# Patient Record
Sex: FEMALE | Race: WHITE | Employment: FULL TIME | URBAN - METROPOLITAN AREA
[De-identification: names, ages, dates, MRNs, and addresses within clinical notes are randomized per-mention and may not be internally consistent; named-entity substitution may affect disease eponyms.]

---

## 2019-02-11 ENCOUNTER — APPOINTMENT (OUTPATIENT)
Dept: GENERAL RADIOLOGY | Facility: CLINIC | Age: 44
End: 2019-02-11
Attending: EMERGENCY MEDICINE
Payer: COMMERCIAL

## 2019-02-11 ENCOUNTER — HOSPITAL ENCOUNTER (EMERGENCY)
Facility: CLINIC | Age: 44
Discharge: HOME | End: 2019-02-11
Attending: EMERGENCY MEDICINE | Admitting: EMERGENCY MEDICINE
Payer: COMMERCIAL

## 2019-02-11 VITALS
TEMPERATURE: 98.1 F | RESPIRATION RATE: 16 BRPM | HEART RATE: 96 BPM | OXYGEN SATURATION: 94 % | SYSTOLIC BLOOD PRESSURE: 128 MMHG | DIASTOLIC BLOOD PRESSURE: 87 MMHG

## 2019-02-11 DIAGNOSIS — R07.89 ATYPICAL CHEST PAIN: ICD-10-CM

## 2019-02-11 LAB
ANION GAP SERPL CALCULATED.3IONS-SCNC: 10 MMOL/L (ref 3–14)
B-HCG FREE SERPL-ACNC: <5 IU/L
BASOPHILS # BLD AUTO: 0.1 10E9/L (ref 0–0.2)
BASOPHILS NFR BLD AUTO: 0.8 %
BUN SERPL-MCNC: 10 MG/DL (ref 7–30)
CALCIUM SERPL-MCNC: 8.5 MG/DL (ref 8.5–10.1)
CHLORIDE SERPL-SCNC: 106 MMOL/L (ref 94–109)
CO2 SERPL-SCNC: 22 MMOL/L (ref 20–32)
CREAT SERPL-MCNC: 0.98 MG/DL (ref 0.52–1.04)
D DIMER PPP FEU-MCNC: <0.3 UG/ML FEU (ref 0–0.5)
DIFFERENTIAL METHOD BLD: ABNORMAL
EOSINOPHIL # BLD AUTO: 0.1 10E9/L (ref 0–0.7)
EOSINOPHIL NFR BLD AUTO: 1.1 %
ERYTHROCYTE [DISTWIDTH] IN BLOOD BY AUTOMATED COUNT: 13.5 % (ref 10–15)
GFR SERPL CREATININE-BSD FRML MDRD: 71 ML/MIN/{1.73_M2}
GLUCOSE SERPL-MCNC: 84 MG/DL (ref 70–99)
HCT VFR BLD AUTO: 40.5 % (ref 35–47)
HGB BLD-MCNC: 12.6 G/DL (ref 11.7–15.7)
IMM GRANULOCYTES # BLD: 0 10E9/L (ref 0–0.4)
IMM GRANULOCYTES NFR BLD: 0.2 %
INTERPRETATION ECG - MUSE: NORMAL
LYMPHOCYTES # BLD AUTO: 1.6 10E9/L (ref 0.8–5.3)
LYMPHOCYTES NFR BLD AUTO: 26.3 %
MCH RBC QN AUTO: 27.2 PG (ref 26.5–33)
MCHC RBC AUTO-ENTMCNC: 31.1 G/DL (ref 31.5–36.5)
MCV RBC AUTO: 87 FL (ref 78–100)
MONOCYTES # BLD AUTO: 0.5 10E9/L (ref 0–1.3)
MONOCYTES NFR BLD AUTO: 7.9 %
NEUTROPHILS # BLD AUTO: 3.9 10E9/L (ref 1.6–8.3)
NEUTROPHILS NFR BLD AUTO: 63.7 %
NRBC # BLD AUTO: 0 10*3/UL
NRBC BLD AUTO-RTO: 0 /100
PLATELET # BLD AUTO: 305 10E9/L (ref 150–450)
POTASSIUM SERPL-SCNC: 3.8 MMOL/L (ref 3.4–5.3)
RBC # BLD AUTO: 4.64 10E12/L (ref 3.8–5.2)
SODIUM SERPL-SCNC: 138 MMOL/L (ref 133–144)
TROPONIN I SERPL-MCNC: <0.015 UG/L (ref 0–0.04)
WBC # BLD AUTO: 6.2 10E9/L (ref 4–11)

## 2019-02-11 PROCEDURE — 85025 COMPLETE CBC W/AUTO DIFF WBC: CPT | Performed by: EMERGENCY MEDICINE

## 2019-02-11 PROCEDURE — 96374 THER/PROPH/DIAG INJ IV PUSH: CPT

## 2019-02-11 PROCEDURE — 85379 FIBRIN DEGRADATION QUANT: CPT | Performed by: EMERGENCY MEDICINE

## 2019-02-11 PROCEDURE — 90791 PSYCH DIAGNOSTIC EVALUATION: CPT

## 2019-02-11 PROCEDURE — 84702 CHORIONIC GONADOTROPIN TEST: CPT

## 2019-02-11 PROCEDURE — 25000128 H RX IP 250 OP 636: Performed by: EMERGENCY MEDICINE

## 2019-02-11 PROCEDURE — 93005 ELECTROCARDIOGRAM TRACING: CPT

## 2019-02-11 PROCEDURE — 71046 X-RAY EXAM CHEST 2 VIEWS: CPT

## 2019-02-11 PROCEDURE — 84484 ASSAY OF TROPONIN QUANT: CPT | Performed by: EMERGENCY MEDICINE

## 2019-02-11 PROCEDURE — 99285 EMERGENCY DEPT VISIT HI MDM: CPT | Mod: 25

## 2019-02-11 PROCEDURE — 80048 BASIC METABOLIC PNL TOTAL CA: CPT | Performed by: EMERGENCY MEDICINE

## 2019-02-11 RX ORDER — LORAZEPAM 2 MG/ML
0.5 INJECTION INTRAMUSCULAR ONCE
Status: COMPLETED | OUTPATIENT
Start: 2019-02-11 | End: 2019-02-11

## 2019-02-11 RX ORDER — LORAZEPAM 0.5 MG/1
0.5 TABLET ORAL EVERY 6 HOURS PRN
Qty: 8 TABLET | Refills: 0 | Status: SHIPPED | OUTPATIENT
Start: 2019-02-11 | End: 2019-03-13

## 2019-02-11 RX ADMIN — LORAZEPAM 0.5 MG: 2 INJECTION INTRAMUSCULAR; INTRAVENOUS at 17:35

## 2019-02-11 SDOH — HEALTH STABILITY: MENTAL HEALTH: HOW OFTEN DO YOU HAVE A DRINK CONTAINING ALCOHOL?: NEVER

## 2019-02-11 ASSESSMENT — ENCOUNTER SYMPTOMS
NERVOUS/ANXIOUS: 1
NECK PAIN: 1
COUGH: 0

## 2019-02-11 NOTE — ED PROVIDER NOTES
"  History     Chief Complaint:  Chest Pain and Anxiety    HPI   Lyric Xavier is a 43 year old female who presents to the emergency department today for evaluation of chest pain and anxiety. For the past week the patient has been having intermittent episodes of chest pain characterized as \"pinching over her heart\", bad heart burn, and aches on the base of her left neck. These episodes last 15 minutes. She does state she has some left-sided leg swelling that is abnormal for her. No recent illness or cough. She does note that she has been under a lot of stress lately. She called her PMD for an appointment today, but has not ever sought psychiatry.    Allergies:  Iodine    Medications:    Medications reviewed. No pertinent medications.    Past Medical History:    Asthma    Past Surgical History:    History reviewed. No pertinent surgical history.    Family History:    Enlarged heart  Grandmother  Clot   Grandmother    Social History:  The patient was accompanied to the ED by nobody.  Smoking Status: Never Smoker  Smokeless Tobacco: Never Used  Alcohol Use: Positive      Review of Systems   Respiratory: Negative for cough.    Cardiovascular: Positive for chest pain and leg swelling.   Musculoskeletal: Positive for neck pain.   Psychiatric/Behavioral: The patient is nervous/anxious.    All other systems reviewed and are negative.    Physical Exam     Patient Vitals for the past 24 hrs:   BP Temp Temp src Pulse Heart Rate Resp SpO2   02/11/19 2000 128/87 -- -- 96 98 -- --   02/11/19 1955 -- -- -- -- -- 16 94 %   02/11/19 1940 129/84 -- -- 102 101 -- --   02/11/19 1900 -- -- -- -- 87 23 96 %   02/11/19 1840 (!) 132/93 -- -- 98 93 -- --   02/11/19 1835 -- -- -- -- 83 17 97 %   02/11/19 1800 123/82 -- -- 86 83 -- --   02/11/19 1755 -- -- -- -- 88 -- --   02/11/19 1743 116/76 -- -- 84 84 17 95 %   02/11/19 1700 120/78 -- -- 85 90 23 --   02/11/19 1620 126/83 -- -- 78 86 -- --   02/11/19 1600 126/85 -- -- -- -- -- -- "   02/11/19 1316 133/87 98.1  F (36.7  C) Temporal -- 88 16 98 %      Physical Exam  Nursing note and vitals reviewed.  Constitutional: Cooperative.   HENT:   Mouth/Throat: Moist mucous membranes.   Eyes: EOMI, nonicteric sclera  Cardiovascular: Normal rate, regular rhythm, no murmurs, rubs, or gallops  Pulmonary/Chest: Effort normal and breath sounds normal. No respiratory distress. No wheezes. No rales.   Abdominal: Soft. Nontender, nondistended, no guarding or rigidity. BS present.   Musculoskeletal: Normal range of motion.   Neurological: Alert. Moves all extremities spontaneously.   Skin: Skin is warm and dry. No rash noted.   Psychiatric: anxious mood and affect.       Emergency Department Course     ECG:  ECG taken at 1307, ECG read at 1643  Normal sinus rhythm  Normal ECG  Rate 80 bpm. IN interval 124 ms. QRS duration 68 ms. QT/QTc 394/454 ms. P-R-T axes 52 56 18.    Imaging:  Radiology findings were communicated with the patient who voiced understanding of the findings.    Chest XR,  PA & LAT  Cardiac silhouette and pulmonary vasculature are within  normal limits. No focal airspace disease, pleural effusion or  pneumothorax.  Reading per radiology    Laboratory:  Laboratory findings were communicated with the patient who voiced understanding of the findings.    HCG: <5.0  D dimer: <0.3  Troponin I: <0.015  CBC: WBC 6.2, HGB 12.6,   BMP: WNL (Creatinine 0.98)    Interventions:  1735 Ativan 0.5 mg IV    Emergency Department Course:    1640 Nursing notes and vitals reviewed.    1645 I performed an exam of the patient as documented above.     1717 IV was inserted and blood was drawn for laboratory testing, results above.     1814 The patient was sent for a XR while in the emergency department, results above.      2130 I personally reviewed the imaging and lab results with the patient and answered all related questions prior to discharge.    Impression & Plan      Medical Decision Making:  Lyric MURRAY  Morenita is a 43 year old female who presents to the emergency department today for evaluation of chest pain. The DDx of the patients chest pain includes ACS, angina, pericarditis, PE, pneumonia, pleuritis, dissection, aneurysmal disease, GERD, esophageal spasm, costochronditis/chest wall pain, etc as the most likely etiologies. Also considered stress/anxiety as pt reports she's been under a significant amount of stress recently. Chest pain workup is negative, and given symptoms have been ongoing for last week, single negative troponin sufficient to rule out ACS. D-dimer rules out PE and DVT. No further testing indicated. Given pt's stress levels, pt wanted to meet with DEC who was able to schedule an appointment for consideration of medications tomorrow morning. No SI/HI or indications for psychiatric admission. Pt is low risk by HEART score. Will have her follow-up with her primary care physician for consideration of further testing. She's discharged in stable condition. All questions answered.     Diagnosis:    ICD-10-CM    1. Atypical chest pain R07.89      Disposition:   Discharge    Discharge Medications:  START taking      Dose / Directions   LORazepam 0.5 MG tablet  Commonly known as:  ATIVAN      Dose:  0.5 mg  Take 1 tablet (0.5 mg) by mouth every 6 hours as needed for anxiety  Quantity:  8 tablet  Refills:  0       Scribe Disclosure:  Rickey ESPAÑA, am serving as a scribe at 4:41 PM on 2/11/2019 to document services personally performed by Aroldo Rudd MD based on my observations and the provider's statements to me.    [facility] EMERGENCY DEPARTMENT       Aroldo Rudd MD  02/14/19 0015

## 2019-02-11 NOTE — LETTER
February 11, 2019      To Whom It May Concern:      Lyric Xavier was seen in our Emergency Department today, 02/11/19.  I expect her condition to improve over the next 2 days.  She may return to work/school when improved.    Sincerely,        Gretchen CASE RN

## 2019-02-11 NOTE — ED AVS SNAPSHOT
[facility] Emergency Department  [address]  [facility] [address]  Phone:  [phone]  Fax:  [phone]                                    Lyric Xavier   MRN: [id number]    Department:  [facility] Emergency Department   Date of Visit:  2/11/2019           After Visit Summary Signature Page    I have received my discharge instructions, and my questions have been answered. I have discussed any challenges I see with this plan with the nurse or doctor.    ..........................................................................................................................................  Patient/Patient Representative Signature      ..........................................................................................................................................  Patient Representative Print Name and Relationship to Patient    ..................................................               ................................................  Date                                   Time    ..........................................................................................................................................  Reviewed by Signature/Title    ...................................................              ..............................................  Date                                               Time          22EPIC Rev 08/18

## 2019-02-11 NOTE — ED TRIAGE NOTES
Patient has been struggling with anxiety for the past week. Notes there is a lot going on in her life right now. Has not been able to sleep at night. Comes to the ER with chest pain and anxiety. She presents near tears and tremulous. She is alert and oriented times four. Respirations are even and easy. Patient also notes she has had increased heartburn lately.